# Patient Record
Sex: FEMALE | Race: WHITE
[De-identification: names, ages, dates, MRNs, and addresses within clinical notes are randomized per-mention and may not be internally consistent; named-entity substitution may affect disease eponyms.]

---

## 2017-10-31 ENCOUNTER — HOSPITAL ENCOUNTER (EMERGENCY)
Dept: HOSPITAL 31 - C.ER | Age: 22
Discharge: HOME | End: 2017-10-31
Payer: COMMERCIAL

## 2017-10-31 VITALS — BODY MASS INDEX: 25.7 KG/M2

## 2017-10-31 VITALS
SYSTOLIC BLOOD PRESSURE: 128 MMHG | DIASTOLIC BLOOD PRESSURE: 81 MMHG | OXYGEN SATURATION: 99 % | HEART RATE: 99 BPM | RESPIRATION RATE: 18 BRPM | TEMPERATURE: 97.7 F

## 2017-10-31 DIAGNOSIS — N39.0: ICD-10-CM

## 2017-10-31 DIAGNOSIS — B37.3: Primary | ICD-10-CM

## 2017-10-31 LAB
BACTERIA #/AREA URNS HPF: (no result) /[HPF]
BILIRUB UR-MCNC: NEGATIVE MG/DL
GLUCOSE UR STRIP-MCNC: NORMAL MG/DL
KETONES UR STRIP-MCNC: NEGATIVE MG/DL
LEUKOCYTE ESTERASE UR-ACNC: (no result) LEU/UL
PH UR STRIP: 6 [PH] (ref 5–8)
PROT UR STRIP-MCNC: NEGATIVE MG/DL
RBC # UR STRIP: NEGATIVE /UL
RBC #/AREA URNS HPF: 4 /HPF (ref 0–3)
SP GR UR STRIP: 1.01 (ref 1–1.03)
UROBILINOGEN UR-MCNC: NORMAL MG/DL (ref 0.2–1)
WBC #/AREA URNS HPF: 22 /HPF (ref 0–5)

## 2017-10-31 NOTE — C.PDOC
History Of Present Illness


23 y/o female c/o dysuria and cheesy vaginal discharge for the last 3 days. 

Patient reports history of vulvovaginal candidiasis 1 year ago. Patient states 

she has protected sex with 1 partner. Denies fever, chills, nausea, vomiting, 

diarrhea, or other associated symptoms.


Time Seen by Provider: 10/31/17 15:08


Chief Complaint (Nursing): Female Genitourinary


History Per: Patient


History/Exam Limitations: no limitations


Onset/Duration Of Symptoms: Days


Current Symptoms Are (Timing): Still Present


Associated Symptoms: denies: Fever, Nausea, Vomiting, Diarrhea


Abnormal Vaginal Bleeding: No





Past Medical History


Reviewed: Historical Data, Nursing Documentation, Vital Signs


Vital Signs: 


 Last Vital Signs











Temp  97.7 F   10/31/17 14:56


 


Pulse  99 H  10/31/17 14:56


 


Resp  18   10/31/17 14:56


 


BP  128/81   10/31/17 14:56


 


Pulse Ox  99   10/31/17 16:03











Family History: States: Unknown Family Hx





- Social History


Hx Tobacco Use: No


Hx Alcohol Use: No


Hx Substance Use: No





- Immunization History


Hx Tetanus Toxoid Vaccination: Yes


Hx Influenza Vaccination: Yes


Hx Pneumococcal Vaccination: No





Review Of Systems


Except As Marked, All Systems Reviewed And Found Negative.


Constitutional: Negative for: Fever, Chills


Respiratory: Negative for: Cough, Shortness of Breath


Gastrointestinal: Negative for: Nausea, Vomiting, Diarrhea


Genitourinary: Positive for: Vaginal Discharge.  Negative for: Dysuria, 

Hematuria, Vaginal Bleeding


Skin: Negative for: Rash





Physical Exam





- Physical Exam


Appears: Non-toxic, No Acute Distress


Skin: Normal Color, Warm, Dry


Head: Atraumatic, Normacephalic


Oral Mucosa: Moist


Chest: Symmetrical


Cardiovascular: Rhythm Regular


Respiratory: Normal Breath Sounds, No Rales, No Rhonchi, No Wheezing


Gastrointestinal/Abdominal: Soft, No Tenderness, No Guarding, No Rebound


Back: Normal Inspection


Pelvic: Vaginal Discharge (cheesy, whitish discharge), No Cervical Motion 

Tenderness, No Adnexal Tenderness, Other (pelvic exam chaperoned by DEJA Gonzales)


Extremity: Normal ROM, Capillary Refill (< 2 sec.)


Extremity: Bilateral: Normal Color And Temperature


Neurological/Psych: Oriented x3, Normal Speech, Normal Cognition





ED Course And Treatment


O2 Sat by Pulse Oximetry: 99 (RA)


Pulse Ox Interpretation: Normal


Progress Note: Treated with Macrobid, Diflucan. UA, UHCG, Chlamydia/GC ordered. 

Cultures ordered/sent to lab.





Medical Decision Making


Medical Decision Making: 





yeast infection, ? mild UTI





low susp of GH/Chlamydia so NOT empirically treated- GC/Chlamydia sent.





Disposition


Doctor Will See Patient In The: Office


Counseled Patient/Family Regarding: Studies Performed, Diagnosis





- Disposition


Referrals: 


[facility] [Outside]


[facility] [Outside]


Disposition: HOME/ ROUTINE


Disposition Time: 16:02


Condition: GOOD


Additional Instructions: 


Vaginal Candidiasis: you were given Diflucan 100 mg orally, which is a COMPLETE 

treatment.  


Symptoms should improve in 1-2 days





UTI:


mild symptoms


Continue Macrobid 100 mg twice a day to complete 3 days of antibiotics


Follow-up in our outpatient [facility] for further GYN care





YOu were NOT treated for GC/Chlamydia, but the tests were sent


Follow-up is usually in about 2-3 days. 


Prescriptions: 


Nitrofurantoin Macrocrystals [Macrobid] 1 cap PO BID #5


Instructions:  Urinary Tract Infection in Women (ED), Vulvovaginal Candidiasis (

ED)


Forms:  [facility] (English)





- Clinical Impression


Clinical Impression: 


 Vulvovaginal candidiasis, UTI (urinary tract infection)








- Scribe Statement


The provider has reviewed the documentation as recorded by the Scribe





SM





All medical record entries made by the Scribe were at my direction and 

personally dictated by me. I have reviewed the chart and agree that the record 

accurately reflects my personal performance of the history, physical exam, 

medical decision making, and the department course for this patient. I have 

also personally directed, reviewed, and agree with the discharge instructions 

and disposition.

## 2018-10-29 ENCOUNTER — HOSPITAL ENCOUNTER (EMERGENCY)
Dept: HOSPITAL 31 - C.ER | Age: 23
LOS: 1 days | Discharge: HOME | End: 2018-10-30
Payer: SELF-PAY

## 2018-10-29 VITALS
TEMPERATURE: 98 F | HEART RATE: 84 BPM | RESPIRATION RATE: 20 BRPM | DIASTOLIC BLOOD PRESSURE: 82 MMHG | SYSTOLIC BLOOD PRESSURE: 118 MMHG | OXYGEN SATURATION: 99 %

## 2018-10-29 VITALS — BODY MASS INDEX: 25.7 KG/M2

## 2018-10-29 DIAGNOSIS — N39.0: Primary | ICD-10-CM

## 2018-10-29 LAB
BACTERIA #/AREA URNS HPF: (no result) /[HPF]
BILIRUB UR-MCNC: NEGATIVE MG/DL
GLUCOSE UR STRIP-MCNC: NORMAL MG/DL
HCG,QUALITATIVE URINE: NEGATIVE
LEUKOCYTE ESTERASE UR-ACNC: (no result) LEU/UL
PH UR STRIP: 7 [PH] (ref 5–8)
PROT UR STRIP-MCNC: NEGATIVE MG/DL
RBC # UR STRIP: (no result) /UL
SP GR UR STRIP: 1 (ref 1–1.03)
SQUAMOUS EPITHIAL: 1 /HPF (ref 0–5)
UROBILINOGEN UR-MCNC: NORMAL MG/DL (ref 0.2–1)
WBC CLUMPS # UR AUTO: (no result) /HPF

## 2018-10-30 NOTE — C.PDOC
History Of Present Illness


23 year old female presents to the ER with a complaint of dysuria and lower back

pain that began today. Patient states she started taking amoxicillin today that 

was given to her by her mother. Denies PMHx of UTI, vaginal bleeding, vaginal 

discharge, abdominal pain, fever, or hematuria.


Time Seen by Provider: 10/29/18 23:48


Chief Complaint (Nursing): Female Genitourinary


History Per: Patient


History/Exam Limitations: no limitations


Onset/Duration Of Symptoms: Hrs


Current Symptoms Are (Timing): Still Present


Quality Of Discomfort: Unable To Describe


Associated Symptoms: Back Pain, Urinary Symptoms (Dysuria, no hematuria).  

denies: Fever, Other (Vaginal discharge, abdominal pain)


Alleviating Factors: None


Recent travel outside of the [place]: No


Abnormal Vaginal Bleeding: No





Past Medical History


Reviewed: Historical Data, Nursing Documentation, Vital Signs


Vital Signs: 





                                Last Vital Signs











Temp  98.0 F   10/29/18 23:19


 


Pulse  84   10/29/18 23:19


 


Resp  20   10/29/18 23:19


 


BP  118/82   10/29/18 23:19


 


Pulse Ox  99   10/29/18 23:19











Family History: States: Unknown Family Hx





- Social History


Hx Tobacco Use: No


Hx Alcohol Use: No


Hx Substance Use: No





- Immunization History


Hx Tetanus Toxoid Vaccination: Yes


Hx Influenza Vaccination: No


Hx Pneumococcal Vaccination: No





Review Of Systems


Constitutional: Negative for: Fever, Chills


Gastrointestinal: Negative for: Abdominal Pain


Genitourinary: Positive for: Dysuria.  Negative for: Hematuria, Vaginal 

Discharge, Vaginal Bleeding


Musculoskeletal: Positive for: Back Pain (lower)





Physical Exam





- Physical Exam


Appears: Non-toxic


Skin: Normal Color, Warm, Dry


Head: Atraumatic, Normacephalic


Eye(s): bilateral: Normal Inspection


Oral Mucosa: Moist


Gastrointestinal/Abdominal: Soft, No Tenderness


Back: No CVA Tenderness


Neurological/Psych: Oriented x3, Normal Speech





ED Course And Treatment


O2 Sat by Pulse Oximetry: 99 (room air)


Pulse Ox Interpretation: Normal


Progress Note: Urinalysis ordered, results were positive for UTI. Patient 

started on macrobid and pyridium and advised to follow up with PMD for further 

evaluation.





Disposition


Counseled Patient/Family Regarding: Diagnosis, Need For Followup, Rx Given





- Disposition


Referrals: 


Nicole Denise DO [[facility]] - 


Disposition: HOME/ ROUTINE


Disposition Time: 00:18


Condition: STABLE


Additional Instructions: 


Increase PO fluids





Take medications as prescribed





Follow up with PCP Return to ER if worse 


Prescriptions: 


Ibuprofen [Motrin] 600 mg PO Q6H #20 tab


Nitrofurantoin Macrocrystals [Macrobid] 1 cap PO BID #14 cap


Phenazopyridine HCl [Pyridium] 100 mg PO TID #6 tab


Instructions:  Urinary Tract Infection, Adult (DC)


Forms:  [facility] (English)





- Clinical Impression


Clinical Impression: 


 Urinary tract infection








- PA / NP / Resident Statement


MD/DO has reviewed & agrees with the documentation as recorded.





- Scribe Statement


The provider has reviewed the documentation as recorded by the Scribe





Jose Juan Peña





All medical record entries made by the Sawibsylwia were at my direction and 

personally dictated by me. I have reviewed the chart and agree that the record 

accurately reflects my personal performance of the history, physical exam, 

medical decision making, and the department course for this patient. I have also

 personally directed, reviewed, and agree with the discharge instructions and 

disposition.